# Patient Record
Sex: MALE | Race: WHITE | NOT HISPANIC OR LATINO | Employment: PART TIME | URBAN - NONMETROPOLITAN AREA
[De-identification: names, ages, dates, MRNs, and addresses within clinical notes are randomized per-mention and may not be internally consistent; named-entity substitution may affect disease eponyms.]

---

## 2024-08-06 ENCOUNTER — EVALUATION (OUTPATIENT)
Dept: PHYSICAL THERAPY | Facility: CLINIC | Age: 54
End: 2024-08-06
Payer: COMMERCIAL

## 2024-08-06 DIAGNOSIS — M54.50 BILATERAL LOW BACK PAIN, UNSPECIFIED CHRONICITY, UNSPECIFIED WHETHER SCIATICA PRESENT: Primary | ICD-10-CM

## 2024-08-06 PROCEDURE — 97110 THERAPEUTIC EXERCISES: CPT

## 2024-08-06 PROCEDURE — 97161 PT EVAL LOW COMPLEX 20 MIN: CPT

## 2024-08-06 NOTE — LETTER
2024    Melyssa Jones PA-C  [address] [facility] [address]    Patient: Mariano Gonzales   YOB: 1970   Date of Visit: 2024     Encounter Diagnosis     ICD-10-CM    1. Bilateral low back pain, unspecified chronicity, unspecified whether sciatica present  M54.50           Dear Dr. Jones:    Thank you for your recent referral of Mariano Gonzales. Please review the attached evaluation summary from Mariano's recent visit.     Please verify that you agree with the plan of care by signing the attached order.     If you have any questions or concerns, please do not hesitate to call.     I sincerely appreciate the opportunity to share in the care of one of your patients and hope to have another opportunity to work with you in the near future.       Sincerely,    Mariano Chnu, PT      Referring Provider:      I certify that I have read the below Plan of Care and certify the need for these services furnished under this plan of treatment while under my care.                    Melyssa Jones PA-C  [address] [facility] [address]  Via Fax: [phone]          PT Evaluation     Today's date: 2024  Patient name: Mariano Gonzales  : 1970  MRN: [id number]  Referring provider: Melyssa Jones PA-C  Dx:   Encounter Diagnosis     ICD-10-CM    1. Bilateral low back pain, unspecified chronicity, unspecified whether sciatica present  M54.50           Start Time: 1600  Stop Time: 1700  Total time in clinic (min): 60 minutes    Assessment  Impairments: abnormal or restricted ROM, impaired physical strength, pain with function and poor posture   Symptom irritability: moderate    Assessment details: Mariano Gonzales presents to therapy with c/o lumbar back pain that has been present for around a year. Pain is localized to midline of his back with slightly worse pain on his R>L.  Pain is worst in the morning unless he spends a lot of his day on his feet. As he walks long distances and is  upright for long periods, he experiences worsening pain on his R side and extends to his R hip/groin region.no numbness, tingling, or altered sensations throughout R or L LE. Lumbar extension, L lateral flexion and R rotation are all provocative for his pain. He reports that lumbar flexion provides mild relief and a stretching sensation. Spinal ROM is limited by pain in the above planes. Long axis distraction of hip provided relief in l/s though as distraction was removed, significant pain in l/s. Tenderness to palpation and hypomobility noted along L4,L5, S1. SI provocation tests all negative. Hip labral and OA tests negative this date. Objective findings, further detailed below, along with signs and symptoms consistent with lumbar pain, likely spondylosis. At this time, would benefit from skilled therapy services in order to maximize function.  Understanding of Dx/Px/POC: excellent     Prognosis: good    Goals  STG: 3 weeks  -pt will be able to tolerate walking for >15 minutes without recreating hip symptoms.   -pt will be able to improve l/s ext 25%   -pt will be able to reduce self reported pain to 4/10    LT weeks  -pt will be able to reduce self reported pain to <3/10  -pt will be able to complete full prone press up without exacerbating symptoms  -pt will be able to walk >30 minutes without recreating hip symptoms  -pt will be able to improve l/s ROM in all planes 25% <while reporting <2/10 pain  -pt will be ind with final HEP    Plan  Patient would benefit from: skilled physical therapy  Referral necessary: No    Planned therapy interventions: strengthening, stretching, therapeutic activities, therapeutic exercise, joint mobilization, massage and activity modification    Frequency: 2x week  Plan of Care beginning date: 2024  Plan of Care expiration date: 2024  Treatment plan discussed with: patient    Subjective Evaluation    History of Present Illness  Mechanism of injury: Mariano Gonzales  reports that he has been having back pains for over a year. The pain is in his mid back predominantly, however he has also started having pain in his right hip and into his groin.  Patient Goals  Patient goals for therapy: decreased pain, improved balance, increased motion, return to sport/leisure activities, independence with ADLs/IADLs and increased strength    Pain  Current pain ratin  At best pain ratin  At worst pain ratin  Location: central lumbar spine, R hip  Quality: sharp, grinding and pressure  Relieving factors: change in position  Aggravating factors: lifting and walking (extension)    Social Support    Employment status: working  Hand dominance: right        Objective     Joint Play     Pain: L4, L5 and S1     Strength/Myotome Testing     Lumbar   Left   Normal strength    Right   Normal strength    Tests     Lumbar     Left   Positive crossed SLR and quadrant.   Negative passive SLR.     Right   Positive quadrant.   Negative crossed SLR and passive SLR.       Flowsheet Rows      Flowsheet Row Most Recent Value   PT/OT G-Codes    Current Score 67   Projected Score 74   FOTO information reviewed Yes   Assessment Type Evaluation             Precautions: n/a     Daily Treatment Diary:      Initial Evaluation Date: 24  Compliance                      Visit Number 1                    Re-Eval  IE                 ASHA   Foto Captured Y                                                Manual                      L/s STM; SI  ->                                                                 Ther-Ex                      Bird dog ->                     Modified curl up ->                                           SLR ->                     Sciatic nerve glides ->                     LTR/open book ->                                                                                                             Neuro Re-Ed                                                                                                 Ther-Act                                                               Modalities

## 2024-08-06 NOTE — PROGRESS NOTES
PT Evaluation     Today's date: 2024  Patient name: Mariano Gonzales  : 1970  MRN: [id number]  Referring provider: Melyssa Jones PA-C  Dx:   Encounter Diagnosis     ICD-10-CM    1. Bilateral low back pain, unspecified chronicity, unspecified whether sciatica present  M54.50           Start Time: 1600  Stop Time: 1700  Total time in clinic (min): 60 minutes    Assessment  Impairments: abnormal or restricted ROM, impaired physical strength, pain with function and poor posture   Symptom irritability: moderate    Assessment details: Mariano Gonzales presents to therapy with c/o lumbar back pain that has been present for around a year. Pain is localized to midline of his back with slightly worse pain on his R>L.  Pain is worst in the morning unless he spends a lot of his day on his feet. As he walks long distances and is upright for long periods, he experiences worsening pain on his R side and extends to his R hip/groin region.no numbness, tingling, or altered sensations throughout R or L LE. Lumbar extension, L lateral flexion and R rotation are all provocative for his pain. He reports that lumbar flexion provides mild relief and a stretching sensation. Spinal ROM is limited by pain in the above planes. Long axis distraction of hip provided relief in l/s though as distraction was removed, significant pain in l/s. Tenderness to palpation and hypomobility noted along L4,L5, S1. SI provocation tests all negative. Hip labral and OA tests negative this date. Objective findings, further detailed below, along with signs and symptoms consistent with lumbar pain, likely spondylosis. At this time, would benefit from skilled therapy services in order to maximize function.  Understanding of Dx/Px/POC: excellent     Prognosis: good    Goals  STG: 3 weeks  -pt will be able to tolerate walking for >15 minutes without recreating hip symptoms.   -pt will be able to improve l/s ext 25%   -pt will be able to reduce self  reported pain to 4/10    LT weeks  -pt will be able to reduce self reported pain to <3/10  -pt will be able to complete full prone press up without exacerbating symptoms  -pt will be able to walk >30 minutes without recreating hip symptoms  -pt will be able to improve l/s ROM in all planes 25% <while reporting <2/10 pain  -pt will be ind with final HEP    Plan  Patient would benefit from: skilled physical therapy  Referral necessary: No    Planned therapy interventions: strengthening, stretching, therapeutic activities, therapeutic exercise, joint mobilization, massage and activity modification    Frequency: 2x week  Plan of Care beginning date: 2024  Plan of Care expiration date: 2024  Treatment plan discussed with: patient    Subjective Evaluation    History of Present Illness  Mechanism of injury: Marinao Gonzales reports that he has been having back pains for over a year. The pain is in his mid back predominantly, however he has also started having pain in his right hip and into his groin.  Patient Goals  Patient goals for therapy: decreased pain, improved balance, increased motion, return to sport/leisure activities, independence with ADLs/IADLs and increased strength    Pain  Current pain ratin  At best pain ratin  At worst pain ratin  Location: central lumbar spine, R hip  Quality: sharp, grinding and pressure  Relieving factors: change in position  Aggravating factors: lifting and walking (extension)    Social Support    Employment status: working  Hand dominance: right        Objective     Joint Play     Pain: L4, L5 and S1     Strength/Myotome Testing     Lumbar   Left   Normal strength    Right   Normal strength    Tests     Lumbar     Left   Positive crossed SLR and quadrant.   Negative passive SLR.     Right   Positive quadrant.   Negative crossed SLR and passive SLR.       Flowsheet Rows      Flowsheet Row Most Recent Value   PT/OT G-Codes    Current Score 67   Projected Score  74   FOTO information reviewed Yes   Assessment Type Evaluation             Precautions: n/a     Daily Treatment Diary:      Initial Evaluation Date: 08/07/24  Compliance 8/6                     Visit Number 1                    Re-Eval  IE                 MC   Foto Captured Y                           8/6                     Manual                      L/s STM; SI  ->                                                                 Ther-Ex                      Bird dog ->                     Modified curl up ->                                           SLR ->                     Sciatic nerve glides ->                     LTR/open book ->                                                                                                             Neuro Re-Ed                                                                                                Ther-Act                                                               Modalities

## 2024-08-08 ENCOUNTER — APPOINTMENT (OUTPATIENT)
Dept: PHYSICAL THERAPY | Facility: CLINIC | Age: 54
End: 2024-08-08
Payer: COMMERCIAL

## 2024-08-13 ENCOUNTER — OFFICE VISIT (OUTPATIENT)
Dept: PHYSICAL THERAPY | Facility: CLINIC | Age: 54
End: 2024-08-13
Payer: COMMERCIAL

## 2024-08-13 DIAGNOSIS — M54.50 BILATERAL LOW BACK PAIN, UNSPECIFIED CHRONICITY, UNSPECIFIED WHETHER SCIATICA PRESENT: Primary | ICD-10-CM

## 2024-08-13 PROCEDURE — 97110 THERAPEUTIC EXERCISES: CPT

## 2024-08-13 PROCEDURE — 97140 MANUAL THERAPY 1/> REGIONS: CPT

## 2024-08-13 NOTE — PROGRESS NOTES
"Daily Note     Today's date: 2024  Patient name: Mariano Gonzales  : 1970  MRN: [id number]  Referring provider: Melyssa Jones PA-C  Dx:   Encounter Diagnosis     ICD-10-CM    1. Bilateral low back pain, unspecified chronicity, unspecified whether sciatica present  M54.50           Start Time: 1430  Stop Time: 1520  Total time in clinic (min): 50 minutes    Subjective: Vasiliy states that he feels his back pain is slowly improving as he experienced decreased overall pain levels. Does feels that he has had a hitch of pain ever so often on his R side.  States exercises at home have been easy and had no issues.      Objective: See treatment diary below      Assessment: Tolerated treatment well. Patient demonstrated fatigue post treatment, exhibited good technique with therapeutic exercises, and would benefit from continued PT. Pt has been compliant with HEP and demonstrated good technique with prescribed exercises. Progressed program as tolerated, demonstrates consistent reproduction of symptoms with R trunk rotation, especially if resistance is applied in shorter ranges. Would cont to benefit from skilled therapy services.      Plan: Continue per plan of care.      Precautions: n/a     Daily Treatment Diary:      Initial Evaluation Date: 24  Compliance                    Visit Number 1 2                   Re-Eval  IE                 MC   Foto Captured Y                                              Manual                      L/s paraspinals STM; SI joint ->  8'                                                               Ther-Ex                      Bird dog ->  15x ea                   catcow -> ->           Modified curl up ->  3x10                                         SLR ->  2x10                   Sciatic nerve glides ->  leg tensioner c strap   10x ea                   LTR/open book ->  10x ea                                         P ball trunk flexion    10x10\"   Add to R + " "L->                                         UTR + flexion  15x                        Plaoff press  10x ea c BTB                                 Neuro Re-Ed                                            Standing march c tra  2#  20x ea  5\" hold           Standing hip abd + ext    3# 20x ea; b/l                                     Ther-Act                                                               Modalities                                                                                    "

## 2024-08-15 ENCOUNTER — OFFICE VISIT (OUTPATIENT)
Dept: PHYSICAL THERAPY | Facility: CLINIC | Age: 54
End: 2024-08-15
Payer: COMMERCIAL

## 2024-08-15 DIAGNOSIS — M54.50 BILATERAL LOW BACK PAIN, UNSPECIFIED CHRONICITY, UNSPECIFIED WHETHER SCIATICA PRESENT: Primary | ICD-10-CM

## 2024-08-15 PROCEDURE — 97140 MANUAL THERAPY 1/> REGIONS: CPT

## 2024-08-15 PROCEDURE — 97110 THERAPEUTIC EXERCISES: CPT

## 2024-08-15 PROCEDURE — 97112 NEUROMUSCULAR REEDUCATION: CPT

## 2024-08-15 NOTE — PROGRESS NOTES
"Daily Note     Today's date: 8/15/2024  Patient name: Mariano Gonzales  : 1970  MRN: [id number]  Referring provider: Melyssa Jones PA-C  Dx:   Encounter Diagnosis     ICD-10-CM    1. Bilateral low back pain, unspecified chronicity, unspecified whether sciatica present  M54.50                      Subjective: Patient notes he was moving around this morning and pain was about the same and minimal. However, he was sitting this afternoon and resting, and he feels tight and sore since this.      Objective: See treatment diary below      Assessment: Tolerated treatment well. He did note significant tightness in L side when performing LTRs. Patient would benefit from continued PT to increase trunk mobility and core strength for improved function in ADLs.      Plan: Continue per plan of care.      Precautions: n/a     Daily Treatment Diary:      Initial Evaluation Date: 24  Compliance 8/6  8/13 8/15                 Visit Number 1 2  3                 Re-Eval  IE                 MC   Foto Captured Y                           8/6  8/13  8/15                 Manual                      L/s paraspinals STM; SI joint ->  8' 10'                                                             Ther-Ex                      Bird dog ->  15x ea 15x ea                 catcow -> -> 10x          Modified curl up ->  3x10  2x10                                       SLR ->  2x10  2x10                 Sciatic nerve glides ->  leg tensioner c strap   10x ea  leg tensioner c strap 5\"x10 ea                 LTR/open book ->  10x ea 5\"x10 ea                                       P ball trunk flexion  10x10\"   Add to R + L->  5\"x10 ea 3 way                                       UTR + flexion  15x 15x UTR                       Plaoff press  10x ea c BTB 10x ea BTB                                Neuro Re-Ed                                            Standing march c tra  2#  20x ea  5\" hold           Standing hip abd + ext    3# 20x ea; " b/l                                     Ther-Act                                                               Modalities

## 2024-08-20 ENCOUNTER — OFFICE VISIT (OUTPATIENT)
Dept: PHYSICAL THERAPY | Facility: CLINIC | Age: 54
End: 2024-08-20
Payer: COMMERCIAL

## 2024-08-20 DIAGNOSIS — M54.50 BILATERAL LOW BACK PAIN, UNSPECIFIED CHRONICITY, UNSPECIFIED WHETHER SCIATICA PRESENT: Primary | ICD-10-CM

## 2024-08-20 PROCEDURE — 97112 NEUROMUSCULAR REEDUCATION: CPT

## 2024-08-20 PROCEDURE — 97140 MANUAL THERAPY 1/> REGIONS: CPT

## 2024-08-20 PROCEDURE — 97110 THERAPEUTIC EXERCISES: CPT

## 2024-08-20 NOTE — PROGRESS NOTES
"Daily Note     Today's date: 2024  Patient name: Mariano Gonzales  : 1970  MRN: [id number]  Referring provider: Melyssa Jones PA-C  Dx:   Encounter Diagnosis     ICD-10-CM    1. Bilateral low back pain, unspecified chronicity, unspecified whether sciatica present  M54.50                      Subjective: Patient reports he has increased R sided low back pain yesterday does not recall change in activity that would have caused this. He notes it is not as bad today, but .       Objective: See treatment diary below      Assessment: Tolerated treatment well without complaint other than fatigue. Patient required moderate verbal cues to perform exercises with appropriate technique and intensity. Significant hypertonicity in L QL and piriformis noted with MT. Moderate relief noted following. Patient would benefit from continued PT to increase trunk mobility and core strength for improved function in daily activities.       Plan: Continue per plan of care.      Precautions: n/a     Daily Treatment Diary:      Initial Evaluation Date: 24  Compliance 8/6  8/13 8/15 8/20               Visit Number 1 2  3 4               Re-Eval  IE                 MC   Foto Captured Y                           8/6  8/13  8/15 8/20               Manual                      L/s paraspinals STM; SI joint ->  8' 10' 15' L QL focus and piri                                                           Ther-Ex                      Bird dog ->  15x ea 15x ea 10x               catcow -> -> 10x 10x         Modified curl up ->  3x10  2x10  nv                                     SLR ->  2x10  2x10  nv               Sciatic nerve glides ->  leg tensioner c strap   10x ea  leg tensioner c strap 5\"x10 ea  leg tensioner c strap 5\"x10 ea               LTR/open book ->  10x ea 5\"x10 ea  5\"x10 ea                                     P ball trunk flexion  10x10\"   Add to R + L->  5\"x10 ea 3 way  5\"x10 ea 3 way                            " "         UTR + flexion  15x 15x UTR 15x ea UTR         Doorway QL stretch    10\"x5         Plaoff press  10x ea c BTB 10x ea BTB 10x ea BTB                               Neuro Re-Ed                                            Standing march c tra  2#  20x ea  5\" hold           Standing hip abd + ext    3# 20x ea; b/l                                     Ther-Act                                                               Modalities                                                                                        "

## 2024-08-22 ENCOUNTER — OFFICE VISIT (OUTPATIENT)
Dept: PHYSICAL THERAPY | Facility: CLINIC | Age: 54
End: 2024-08-22
Payer: COMMERCIAL

## 2024-08-22 DIAGNOSIS — M54.50 BILATERAL LOW BACK PAIN, UNSPECIFIED CHRONICITY, UNSPECIFIED WHETHER SCIATICA PRESENT: Primary | ICD-10-CM

## 2024-08-22 PROCEDURE — 97140 MANUAL THERAPY 1/> REGIONS: CPT

## 2024-08-22 PROCEDURE — 97110 THERAPEUTIC EXERCISES: CPT

## 2024-08-22 NOTE — PROGRESS NOTES
"Daily Note     Today's date: 2024  Patient name: Mariano Gonzales  : 1970  MRN: [id number]  Referring provider: Melyssa Jones PA-C  Dx:   Encounter Diagnosis     ICD-10-CM    1. Bilateral low back pain, unspecified chronicity, unspecified whether sciatica present  M54.50           Start Time: 1535  Stop Time: 1625  Total time in clinic (min): 50 minutes    Subjective: States that he was having increase low back and QL soreness today.      Objective: See treatment diary below      Assessment: Tolerated treatment well. Patient demonstrated fatigue post treatment, exhibited good technique with therapeutic exercises, and would benefit from continued PT. Completed all exercises with increased volume and time during STM spent to focus on QL and piriformis due to pains today. Would cont to benefit from skilled therapy services.      Plan: Continue per plan of care.      Precautions: n/a     Daily Treatment Diary:      Initial Evaluation Date: 24  Compliance 8/6  8/13 8/15 8/20  8/22             Visit Number 1 2  3 4  5             Re-Eval  IE                 MC   Foto Captured Y       Y                    8/6  8/13  8/15 8/20  8/22             Manual                      L/s paraspinals STM; SI joint ->  8' 10' 15' L QL focus and piri                                                           Ther-Ex                      Bird dog ->  15x ea 15x ea 10x  15x             catcow -> -> 10x 10x 15x        Modified curl up ->  3x10  2x10  nv  20x             H/s stretch         4x30\" ea             SLR ->  2x10  2x10  nv  20x             Sciatic nerve glides ->  leg tensioner c strap   10x ea  leg tensioner c strap 5\"x10 ea  leg tensioner c strap 5\"x10 ea  5\"x 10             LTR/open book ->  10x ea 5\"x10 ea  5\"x10 ea  5\"x10                                   P ball trunk flexion  10x10\"   Add to R + L->  5\"x10 ea 3 way  5\"x10 ea 3 way  5\"x10 ea 3 way                                   UTR + flexion  15x 15x UTR " "15x ea UTR 15x ea UT         Doorway QL stretch    10\"x5 10x5\"        Plaoff press  10x ea c BTB 10x ea BTB 10x ea BTB 10x ea BTB                              Neuro Re-Ed                                            Standing march c tra  2#  20x ea  5\" hold           Standing hip abd + ext    3# 20x ea; b/l                                     Ther-Act                                                               Modalities                                                                                          "

## 2024-08-27 ENCOUNTER — APPOINTMENT (OUTPATIENT)
Dept: PHYSICAL THERAPY | Facility: CLINIC | Age: 54
End: 2024-08-27
Payer: COMMERCIAL

## 2024-08-29 ENCOUNTER — OFFICE VISIT (OUTPATIENT)
Dept: PHYSICAL THERAPY | Facility: CLINIC | Age: 54
End: 2024-08-29
Payer: COMMERCIAL

## 2024-08-29 DIAGNOSIS — M54.50 BILATERAL LOW BACK PAIN, UNSPECIFIED CHRONICITY, UNSPECIFIED WHETHER SCIATICA PRESENT: Primary | ICD-10-CM

## 2024-08-29 PROCEDURE — 97140 MANUAL THERAPY 1/> REGIONS: CPT

## 2024-08-29 PROCEDURE — 97110 THERAPEUTIC EXERCISES: CPT

## 2024-08-29 NOTE — PROGRESS NOTES
"Daily Note     Today's date: 2024  Patient name: Mariano Gonzales  : 1970  MRN: [id number]  Referring provider: Melyssa Jones PA-C  Dx:   Encounter Diagnosis     ICD-10-CM    1. Bilateral low back pain, unspecified chronicity, unspecified whether sciatica present  M54.50           Start Time: 1415  Stop Time: 1510  Total time in clinic (min): 55 minutes    Subjective: Reports that he has been feeling a bit stiff in his low back.  But pain is doing well.      Objective: See treatment diary below      Assessment: Tolerated treatment well. Patient demonstrated fatigue post treatment, exhibited good technique with therapeutic exercises, and would benefit from continued PT      Plan: Continue per plan of care.      Precautions: n/a     Daily Treatment Diary:      Initial Evaluation Date: 24  Compliance 8/6  8/13 8/15 8/20  8/22  8/29           Visit Number 1 2  3 4  5  6           Re-Eval  IE                 MC   Foto Captured Y       Y                    8/6  8/13  8/15 8/20  8/22  8/29           Manual                      L/s paraspinals STM; SI joint ->  8' 10' 15' L QL focus and piri    15'/                                                       Ther-Ex                      Bird dog ->  15x ea 15x ea 10x  15x  15x           catcow -> -> 10x 10x 15x 15x       Modified curl up ->  3x10  2x10  nv  20x  20x           H/s stretch         4x30\" ea  4x30\"           SLR ->  2x10  2x10  nv  20x  20x           Sciatic nerve glides ->  leg tensioner c strap   10x ea  leg tensioner c strap 5\"x10 ea  leg tensioner c strap 5\"x10 ea  5\"x 10  5\"x10           LTR/open book ->  10x ea 5\"x10 ea  5\"x10 ea  5\"x10  5\"x10                                 P ball trunk flexion  10x10\"   Add to R + L->  5\"x10 ea 3 way  5\"x10 ea 3 way  5\"x10 ea 3 way  5\"x10 ea 3 way                                 UTR + flexion  15x 15x UTR 15x ea UTR 15x ea UT  15x ea UT       Doorway QL stretch    10\"x5 10x5\" 10x 5\"       Plaoff press  " "10x ea c BTB 10x ea BTB 10x ea BTB 10x ea BTB 10x ea BTB                             Neuro Re-Ed                                            Standing march c tra  2#  20x ea  5\" hold    2# 20x ea 5\" hold       Standing hip abd + ext    3# 20x ea; b/l    3# 20x ea; b/l                                 Ther-Act                                                               Modalities                                                                                            "